# Patient Record
Sex: MALE | Race: WHITE | NOT HISPANIC OR LATINO | Employment: FULL TIME | URBAN - METROPOLITAN AREA
[De-identification: names, ages, dates, MRNs, and addresses within clinical notes are randomized per-mention and may not be internally consistent; named-entity substitution may affect disease eponyms.]

---

## 2021-04-05 ENCOUNTER — HOSPITAL ENCOUNTER (EMERGENCY)
Facility: HOSPITAL | Age: 66
Discharge: HOME | End: 2021-04-05
Attending: EMERGENCY MEDICINE
Payer: COMMERCIAL

## 2021-04-05 ENCOUNTER — APPOINTMENT (EMERGENCY)
Dept: RADIOLOGY | Facility: HOSPITAL | Age: 66
End: 2021-04-05
Payer: COMMERCIAL

## 2021-04-05 VITALS
TEMPERATURE: 98.8 F | HEART RATE: 101 BPM | WEIGHT: 260 LBS | OXYGEN SATURATION: 92 % | SYSTOLIC BLOOD PRESSURE: 104 MMHG | RESPIRATION RATE: 16 BRPM | BODY MASS INDEX: 30.7 KG/M2 | DIASTOLIC BLOOD PRESSURE: 55 MMHG | HEIGHT: 77 IN

## 2021-04-05 DIAGNOSIS — U07.1 PNEUMONIA DUE TO COVID-19 VIRUS: Primary | ICD-10-CM

## 2021-04-05 DIAGNOSIS — J12.82 PNEUMONIA DUE TO COVID-19 VIRUS: Primary | ICD-10-CM

## 2021-04-05 LAB
ANION GAP SERPL CALCULATED.3IONS-SCNC: 8 MMOL/L (ref 4–13)
BASOPHILS # BLD AUTO: 0.01 THOUSANDS/ΜL (ref 0–0.1)
BASOPHILS NFR BLD AUTO: 0 % (ref 0–1)
BUN SERPL-MCNC: 15 MG/DL (ref 5–25)
CALCIUM SERPL-MCNC: 8.2 MG/DL (ref 8.3–10.1)
CHLORIDE SERPL-SCNC: 95 MMOL/L (ref 100–108)
CO2 SERPL-SCNC: 30 MMOL/L (ref 21–32)
CREAT SERPL-MCNC: 1.12 MG/DL (ref 0.6–1.3)
EOSINOPHIL # BLD AUTO: 0 THOUSAND/ΜL (ref 0–0.61)
EOSINOPHIL NFR BLD AUTO: 0 % (ref 0–6)
ERYTHROCYTE [DISTWIDTH] IN BLOOD BY AUTOMATED COUNT: 12.9 % (ref 11.6–15.1)
FLUAV RNA RESP QL NAA+PROBE: NEGATIVE
FLUBV RNA RESP QL NAA+PROBE: NEGATIVE
GFR SERPL CREATININE-BSD FRML MDRD: 69 ML/MIN/1.73SQ M
GLUCOSE SERPL-MCNC: 116 MG/DL (ref 65–140)
HCT VFR BLD AUTO: 46.5 % (ref 36.5–49.3)
HGB BLD-MCNC: 15.8 G/DL (ref 12–17)
IMM GRANULOCYTES # BLD AUTO: 0.02 THOUSAND/UL (ref 0–0.2)
IMM GRANULOCYTES NFR BLD AUTO: 1 % (ref 0–2)
LYMPHOCYTES # BLD AUTO: 0.41 THOUSANDS/ΜL (ref 0.6–4.47)
LYMPHOCYTES NFR BLD AUTO: 13 % (ref 14–44)
MCH RBC QN AUTO: 29.5 PG (ref 26.8–34.3)
MCHC RBC AUTO-ENTMCNC: 34 G/DL (ref 31.4–37.4)
MCV RBC AUTO: 87 FL (ref 82–98)
MONOCYTES # BLD AUTO: 0.28 THOUSAND/ΜL (ref 0.17–1.22)
MONOCYTES NFR BLD AUTO: 9 % (ref 4–12)
NEUTROPHILS # BLD AUTO: 2.47 THOUSANDS/ΜL (ref 1.85–7.62)
NEUTS SEG NFR BLD AUTO: 77 % (ref 43–75)
NRBC BLD AUTO-RTO: 0 /100 WBCS
PLATELET # BLD AUTO: 129 THOUSANDS/UL (ref 149–390)
PMV BLD AUTO: 11.1 FL (ref 8.9–12.7)
POTASSIUM SERPL-SCNC: 3.2 MMOL/L (ref 3.5–5.3)
RBC # BLD AUTO: 5.36 MILLION/UL (ref 3.88–5.62)
RSV RNA RESP QL NAA+PROBE: NEGATIVE
SARS-COV-2 RNA RESP QL NAA+PROBE: POSITIVE
SODIUM SERPL-SCNC: 133 MMOL/L (ref 136–145)
WBC # BLD AUTO: 3.19 THOUSAND/UL (ref 4.31–10.16)

## 2021-04-05 PROCEDURE — 36415 COLL VENOUS BLD VENIPUNCTURE: CPT | Performed by: PHYSICIAN ASSISTANT

## 2021-04-05 PROCEDURE — 99284 EMERGENCY DEPT VISIT MOD MDM: CPT | Performed by: PHYSICIAN ASSISTANT

## 2021-04-05 PROCEDURE — 80048 BASIC METABOLIC PNL TOTAL CA: CPT | Performed by: PHYSICIAN ASSISTANT

## 2021-04-05 PROCEDURE — 0241U HB NFCT DS VIR RESP RNA 4 TRGT: CPT | Performed by: PHYSICIAN ASSISTANT

## 2021-04-05 PROCEDURE — 99284 EMERGENCY DEPT VISIT MOD MDM: CPT

## 2021-04-05 PROCEDURE — 85025 COMPLETE CBC W/AUTO DIFF WBC: CPT | Performed by: PHYSICIAN ASSISTANT

## 2021-04-05 PROCEDURE — 71045 X-RAY EXAM CHEST 1 VIEW: CPT

## 2021-04-05 RX ORDER — ZINC SULFATE 50(220)MG
220 CAPSULE ORAL DAILY
Qty: 10 CAPSULE | Refills: 0 | Status: SHIPPED | OUTPATIENT
Start: 2021-04-05 | End: 2021-04-15

## 2021-04-05 RX ORDER — MULTIVIT WITH MINERALS/LUTEIN
1000 TABLET ORAL DAILY
Qty: 10 TABLET | Refills: 0 | Status: SHIPPED | OUTPATIENT
Start: 2021-04-05 | End: 2021-04-15

## 2021-04-05 RX ORDER — MELATONIN
1000 DAILY
Qty: 10 TABLET | Refills: 0 | Status: SHIPPED | OUTPATIENT
Start: 2021-04-05 | End: 2021-04-15

## 2021-04-05 RX ORDER — AZITHROMYCIN 250 MG/1
TABLET, FILM COATED ORAL
Qty: 6 TABLET | Refills: 0 | Status: SHIPPED | OUTPATIENT
Start: 2021-04-05 | End: 2021-04-09

## 2021-04-05 NOTE — Clinical Note
Kelsi Stover was seen and treated in our emergency department on 4/5/2021  Diagnosis:     Demetra Son  may return to work on return date  He may return on this date: 04/15/2021    Pt is COVID positive  Quarantine for 10 days from start of symptoms, or 7 days with no symptoms and fever free for 24 hours  If you have any questions or concerns, please don't hesitate to call        Nino Lopes RN    ______________________________           _______________          _______________  Hospital Representative                              Date                                Time

## 2021-04-05 NOTE — ED PROVIDER NOTES
History  Chief Complaint   Patient presents with    Flu Symptoms     pt with flu like symptoms, fatigue and no appetite      60 y o  male presents to ED with chief complaint of flu like symptoms  Onset reported as 4 days ago  Location of symptoms described as the multiple sites  Quality is described as congestion/cough/fatigue  Severity is reported as moderate  Associated symptoms:   denies fevers  denies chills  Positive for sinus congestion  Positive for runny nose   Positive for cough  denies sore throat  Positive for myalgias  Positive for headaches  denies nausea  denies vomiting  Denies rash  Modifiers: nothing has been tried for treatment of symptoms  Context: patient reports flu like symptoms including fatigue and cough for the past 4-5 days  Wife recently sick with similar symptoms last week  Patient concerned that he has covid  Medical summary: review of past visit history via EPIC demonstrates no prior visits to this ed  History provided by:  Patient   used: No    Flu Symptoms  Presenting symptoms: cough, fatigue, myalgias and rhinorrhea    Presenting symptoms: no diarrhea, no fever, no headaches, no nausea, no shortness of breath, no sore throat and no vomiting    Associated symptoms: nasal congestion    Associated symptoms: no chills, no ear pain and no neck stiffness        None       History reviewed  No pertinent past medical history  History reviewed  No pertinent surgical history  History reviewed  No pertinent family history  I have reviewed and agree with the history as documented  E-Cigarette/Vaping     E-Cigarette/Vaping Substances     Social History     Tobacco Use    Smoking status: Never Smoker    Smokeless tobacco: Never Used   Substance Use Topics    Alcohol use: Not Currently    Drug use: Never       Review of Systems   Constitutional: Positive for fatigue   Negative for activity change, chills, diaphoresis, fever and unexpected weight change  HENT: Positive for congestion, postnasal drip and rhinorrhea  Negative for dental problem, drooling, ear discharge, ear pain, facial swelling, hearing loss, mouth sores, nosebleeds, sinus pressure, sneezing, sore throat, tinnitus, trouble swallowing and voice change  Eyes: Negative for photophobia, pain, discharge, redness, itching and visual disturbance  Respiratory: Positive for cough  Negative for chest tightness, shortness of breath, wheezing and stridor  Cardiovascular: Negative for chest pain, palpitations and leg swelling  Gastrointestinal: Negative for abdominal pain, constipation, diarrhea, nausea and vomiting  Endocrine: Negative for cold intolerance, heat intolerance, polydipsia, polyphagia and polyuria  Genitourinary: Negative for decreased urine volume, difficulty urinating, dysuria, flank pain, hematuria and urgency  Musculoskeletal: Positive for myalgias  Negative for arthralgias, back pain, gait problem, joint swelling, neck pain and neck stiffness  Skin: Negative for color change, pallor, rash and wound  Allergic/Immunologic: Negative for environmental allergies, food allergies and immunocompromised state  Neurological: Negative for dizziness, tremors, seizures, syncope, facial asymmetry, speech difficulty, weakness, light-headedness, numbness and headaches  Hematological: Negative for adenopathy  Does not bruise/bleed easily  Psychiatric/Behavioral: Negative for agitation, behavioral problems and confusion  The patient is not nervous/anxious  All other systems reviewed and are negative  Physical Exam  Physical Exam  Vitals signs and nursing note reviewed  Constitutional:       General: He is not in acute distress  Appearance: Normal appearance  Comments: /55   Pulse 101   Temp 98 8 °F (37 1 °C)   Resp 16   Ht 6' 5" (1 956 m)   Wt 118 kg (260 lb)   SpO2 92%   BMI 30 83 kg/m²      HENT:      Head: Normocephalic and atraumatic  Right Ear: External ear normal       Left Ear: External ear normal       Nose: Congestion and rhinorrhea present  Eyes:      General: No scleral icterus  Right eye: No discharge  Left eye: No discharge  Conjunctiva/sclera: Conjunctivae normal    Neck:      Musculoskeletal: Normal range of motion and neck supple  No neck rigidity or muscular tenderness  Cardiovascular:      Rate and Rhythm: Normal rate  Pulses: Normal pulses  Pulmonary:      Effort: Pulmonary effort is normal  No respiratory distress  Musculoskeletal: Normal range of motion  General: No tenderness, deformity or signs of injury  Skin:     Capillary Refill: Capillary refill takes less than 2 seconds  Coloration: Skin is not jaundiced  Findings: No erythema or rash  Neurological:      General: No focal deficit present  Mental Status: He is alert and oriented to person, place, and time  Gait: Gait normal    Psychiatric:         Mood and Affect: Mood normal          Behavior: Behavior normal          Vital Signs  ED Triage Vitals [04/05/21 0748]   Temperature Pulse Respirations Blood Pressure SpO2   98 8 °F (37 1 °C) 101 16 104/55 92 %      Temp src Heart Rate Source Patient Position - Orthostatic VS BP Location FiO2 (%)   -- -- -- -- --      Pain Score       --           Vitals:    04/05/21 0748   BP: 104/55   Pulse: 101         Visual Acuity      ED Medications  Medications - No data to display    Diagnostic Studies  Results Reviewed     Procedure Component Value Units Date/Time    COVID19, Influenza A/B, RSV PCR, [facility] [[id number]]  (Abnormal) Collected: 04/05/21 0806    Lab Status: Final result Specimen: Nares from Nose Updated: 04/05/21 0854     SARS-CoV-2 Positive     INFLUENZA A PCR Negative     INFLUENZA B PCR Negative     RSV PCR Negative    Narrative: This test has been authorized by FDA under an EUA (Emergency Use Assay) for use by authorized laboratories    Clinical caution and judgement should be used with the interpretation of these results with consideration of the clinical impression and other laboratory testing  Testing reported as "Positive" or "Negative" has been proven to be accurate according to standard laboratory validation requirements  All testing is performed with control materials showing appropriate reactivity at standard intervals      CBC and differential [[id number]]  (Abnormal) Collected: 04/05/21 0806    Lab Status: Final result Specimen: Blood from Arm, Left Updated: 04/05/21 0821     WBC 3 19 Thousand/uL      RBC 5 36 Million/uL      Hemoglobin 15 8 g/dL      Hematocrit 46 5 %      MCV 87 fL      MCH 29 5 pg      MCHC 34 0 g/dL      RDW 12 9 %      MPV 11 1 fL      Platelets 105 Thousands/uL      nRBC 0 /100 WBCs      Neutrophils Relative 77 %      Immat GRANS % 1 %      Lymphocytes Relative 13 %      Monocytes Relative 9 %      Eosinophils Relative 0 %      Basophils Relative 0 %      Neutrophils Absolute 2 47 Thousands/µL      Immature Grans Absolute 0 02 Thousand/uL      Lymphocytes Absolute 0 41 Thousands/µL      Monocytes Absolute 0 28 Thousand/µL      Eosinophils Absolute 0 00 Thousand/µL      Basophils Absolute 0 01 Thousands/µL     Basic metabolic panel [[id number]]  (Abnormal) Collected: 04/05/21 0806    Lab Status: Final result Specimen: Blood from Arm, Left Updated: 04/05/21 0820     Sodium 133 mmol/L      Potassium 3 2 mmol/L      Chloride 95 mmol/L      CO2 30 mmol/L      ANION GAP 8 mmol/L      BUN 15 mg/dL      Creatinine 1 12 mg/dL      Glucose 116 mg/dL      Calcium 8 2 mg/dL      eGFR 69 ml/min/1 73sq m     Narrative:      [place] guidelines for Chronic Kidney Disease (CKD):     Stage 1 with normal or high GFR (GFR > 90 mL/min/1 73 square meters)    Stage 2 Mild CKD (GFR = 60-89 mL/min/1 73 square meters)    Stage 3A Moderate CKD (GFR = 45-59 mL/min/1 73 square meters)    Stage 3B Moderate CKD (GFR = 30-44 mL/min/1 73 square meters)    Stage 4 Severe CKD (GFR = 15-29 mL/min/1 73 square meters)    Stage 5 End Stage CKD (GFR <15 mL/min/1 73 square meters)  Note: GFR calculation is accurate only with a steady state creatinine                 XR chest 1 view portable   Final Result by Apple Montesinos MD (04/05 4409)      Increased opacity in the right base, suspicious for pneumonia  The study was marked in [facility] for immediate notification  Workstation performed: [id number]                    Procedures  Procedures         ED Course  ED Course as of Apr 05 0915   Mon Apr 05, 2021   5833 Lab results reviewed  Covid test positive  CBC remarkable for mildly low WBC 3 1 likely secondary to covid  Hgb and hct normal   BMP remarkable for mildly low potassium 3 2   bun 15, creatinine 1 1  normal        0908 Re-evaluation patient with normal oxygen saturation  No hypoxia  Covid test positive  Informed of positive test   Discussed treatment plan including tylenol/ibuprofen for fevers/rest, encourage fluids  Vitamin c, vitamin d, zinc and continued monitoring symptoms  Follow up with pcp in 2-3 days for recheck  Reviewed reasons to return to ed  Patient verbalized understanding of diagnosis and agreement with discharge plan of care as well as understanding of reasons to return to ed          0909 Cxr images independently visualized and interpreted by me  Radiology report reviewed: Cardiomediastinal silhouette appears unremarkable  Increased opacity in the right base   No effusion or pneumothorax  Osseous structures appear within normal limits for patient age  Impression:      Increased opacity in the right base, suspicious for pneumonia  The study was marked in [facility] for immediate notification  SBIRT 22yo+      Most Recent Value   SBIRT (24 yo +)   In order to provide better care to our patients, we are screening all of our patients for alcohol and drug use  Would it be okay to ask you these screening questions? Yes Filed at: 04/05/2021 0751   Initial Alcohol Screen: US AUDIT-C    1  How often do you have a drink containing alcohol?  0 Filed at: 04/05/2021 0751   2  How many drinks containing alcohol do you have on a typical day you are drinking? 0 Filed at: 04/05/2021 0751   3a  Male UNDER 65: How often do you have five or more drinks on one occasion? 0 Filed at: 04/05/2021 0751   3b  FEMALE Any Age, or MALE 65+: How often do you have 4 or more drinks on one occassion? 0 Filed at: 04/05/2021 0751   Audit-C Score  0 Filed at: 04/05/2021 5288   KAYLA: How many times in the past year have you    Used an illegal drug or used a prescription medication for non-medical reasons? Never Filed at: 04/05/2021 0751                    MDM  Number of Diagnoses or Management Options  Pneumonia due to COVID-19 virus: new and requires workup  Diagnosis management comments: ddx includes but is not limited to: COVID,  URI, RSV, sinusitis, OE, OM, serous otitis media,  flu, allergies, viral syndrome, asthma, bronchitis, pneumonia, strep throat  Plan covid swab  Amount and/or Complexity of Data Reviewed  Clinical lab tests: ordered and reviewed  Tests in the radiology section of CPT®: ordered and reviewed  Discussion of test results with the performing providers: yes  Obtain history from someone other than the patient: yes (spouse)  Review and summarize past medical records: yes  Independent visualization of images, tracings, or specimens: yes    Risk of Complications, Morbidity, and/or Mortality  General comments: ED course:  76-year-old male with 4 days of flu-like symptoms  Concerned about COVID  Wife recently had suspected exposure  Patient reports increasing fatigue, cough, body aches over the past 4 days  ED workup demonstrates a positive COVID test   Chest x-ray shows changes concerning for right-sided infiltrate representing pneumonia    Patient demonstrates no hypoxia here in the emergency department  Discussed all test results and treatment plan  Will treat with azithromycin  Add vitamin-C, vitamin-D and zinc   Discussed supportive care  Discussed use of Tylenol and ibuprofen for fever control  Discussed follow-up with primary care physician 2-3 days for recheck  Follow-up with pulmonology in 1 week if symptoms are worsening  Extensively reviewed reasons to return to emergency department  Reviewed reasons to return to ed  Patient verbalized understanding of diagnosis and agreement with discharge plan of care as well as understanding of reasons to return to ed        Patient was seen during the outbreak of the corona virus epidemic   Resources are limited due to the severity of patient illnesses associated with virus   Testing is also limited at this time   Discussed with patient at the time of this evaluation   Due to the fact that limited resources are available -treatment options are limited  Patient Progress  Patient progress: stable      Disposition  Final diagnoses:   Pneumonia due to COVID-19 virus     Time reflects when diagnosis was documented in both MDM as applicable and the Disposition within this note     Time User Action Codes Description Comment    4/5/2021  9:11 AM Lori Cotter Add [U07 1,  J12 82] Pneumonia due to COVID-19 virus       ED Disposition     ED Disposition Condition Date/Time Comment    Discharge Stable Mon Apr 5, 2021  9:10  [facility] discharge to home/self care              Follow-up Information     Follow up With Specialties Details Why Contact Info Additional [address] [facility] Emergency Department Emergency Medicine Go to  If symptoms worsen [address] [facility] [id number] [facility] Emergency Department, [address] [facility], [address] [place], [place] Family Medicine Call in 1 day for further evaluation of symptoms [address]  [facility]       Andi Flanagan MD Pulmonology, Neurology, Pulmonary Disease, Sleep Medicine Call in 1 week for further evaluation of symptoms [address] [id number] [phone]             Patient's Medications   Discharge Prescriptions    ASCORBIC ACID (VITAMIN C) 1000 MG TABLET    Take 1 tablet (1,000 mg total) by mouth daily for 10 days       Start Date: 4/5/2021  End Date: 4/15/2021       Order Dose: 1,000 mg       Quantity: 10 tablet    Refills: 0    AZITHROMYCIN (ZITHROMAX) 250 MG TABLET    2 tablets PO on day 1, then 1 tablet PO daily x 4 days  Start Date: 4/5/2021  End Date: 4/9/2021       Order Dose: --       Quantity: 6 tablet    Refills: 0    CHOLECALCIFEROL (VITAMIN D3) 1,000 UNITS TABLET    Take 1 tablet (1,000 Units total) by mouth daily for 10 days       Start Date: 4/5/2021  End Date: 4/15/2021       Order Dose: 1,000 Units       Quantity: 10 tablet    Refills: 0    ZINC SULFATE (ZINCATE) 220 MG CAPSULE    Take 1 capsule (220 mg total) by mouth daily for 10 days       Start Date: 4/5/2021  End Date: 4/15/2021       Order Dose: 220 mg       Quantity: 10 capsule    Refills: 0     No discharge procedures on file      PDMP Review     None          ED Provider  Electronically Signed by           Dirk Yoder PA-C  04/05/21 7753